# Patient Record
Sex: MALE | Race: WHITE
[De-identification: names, ages, dates, MRNs, and addresses within clinical notes are randomized per-mention and may not be internally consistent; named-entity substitution may affect disease eponyms.]

---

## 2018-09-04 ENCOUNTER — HOSPITAL ENCOUNTER (EMERGENCY)
Dept: HOSPITAL 12 - ER | Age: 48
LOS: 1 days | Discharge: HOME | End: 2018-09-05
Payer: COMMERCIAL

## 2018-09-04 VITALS — WEIGHT: 176 LBS | HEIGHT: 67 IN | BODY MASS INDEX: 27.62 KG/M2

## 2018-09-04 DIAGNOSIS — K12.2: Primary | ICD-10-CM

## 2018-09-04 DIAGNOSIS — F17.200: ICD-10-CM

## 2018-09-04 DIAGNOSIS — I25.10: ICD-10-CM

## 2018-09-04 LAB
ALP SERPL-CCNC: 90 U/L (ref 50–136)
ALT SERPL W/O P-5'-P-CCNC: 46 U/L (ref 16–63)
AST SERPL-CCNC: 29 U/L (ref 15–37)
BASOPHILS # BLD AUTO: 0.1 K/UL (ref 0–8)
BASOPHILS NFR BLD AUTO: 1 % (ref 0–2)
BILIRUB DIRECT SERPL-MCNC: 0.1 MG/DL (ref 0–0.2)
BILIRUB SERPL-MCNC: 0.3 MG/DL (ref 0.2–1)
BUN SERPL-MCNC: 12 MG/DL (ref 7–18)
CHLORIDE SERPL-SCNC: 108 MMOL/L (ref 98–107)
CO2 SERPL-SCNC: 28 MMOL/L (ref 21–32)
CREAT SERPL-MCNC: 0.7 MG/DL (ref 0.6–1.3)
EOSINOPHIL # BLD AUTO: 0.4 K/UL (ref 0–0.7)
EOSINOPHIL NFR BLD AUTO: 2.7 % (ref 0–7)
GLUCOSE SERPL-MCNC: 95 MG/DL (ref 74–106)
HCT VFR BLD AUTO: 41.9 % (ref 36.7–47.1)
HGB BLD-MCNC: 14.4 G/DL (ref 12.5–16.3)
LYMPHOCYTES # BLD AUTO: 3.3 K/UL (ref 20–40)
LYMPHOCYTES NFR BLD AUTO: 25.3 % (ref 20.5–51.5)
MCH RBC QN AUTO: 32.1 UUG (ref 23.8–33.4)
MCHC RBC AUTO-ENTMCNC: 34 G/DL (ref 32.5–36.3)
MCV RBC AUTO: 93.2 FL (ref 73–96.2)
MONOCYTES # BLD AUTO: 0.8 K/UL (ref 2–10)
MONOCYTES NFR BLD AUTO: 5.8 % (ref 0–11)
NEUTROPHILS # BLD AUTO: 8.5 K/UL (ref 1.8–8.9)
NEUTROPHILS NFR BLD AUTO: 65.2 % (ref 38.5–71.5)
PLATELET # BLD AUTO: 357 K/UL (ref 152–348)
POTASSIUM SERPL-SCNC: 3.7 MMOL/L (ref 3.5–5.1)
RBC # BLD AUTO: 4.49 MIL/UL (ref 4.06–5.63)
WBC # BLD AUTO: 13 K/UL (ref 3.6–10.2)
WS STN SPEC: 7.4 G/DL (ref 6.4–8.2)

## 2018-09-04 PROCEDURE — A4663 DIALYSIS BLOOD PRESSURE CUFF: HCPCS

## 2018-09-05 VITALS — DIASTOLIC BLOOD PRESSURE: 75 MMHG | SYSTOLIC BLOOD PRESSURE: 105 MMHG

## 2018-09-05 NOTE — NUR
Patient discharged to home in stable conditon.  Written and verbal after care 
instructions given. 

Patient verbalizes understanding of instructions. Pt out of ER with steady 
gait, no acute signs of distress, VSS, all belongings taken, IV site 
discontinued.

## 2019-02-16 ENCOUNTER — HOSPITAL ENCOUNTER (EMERGENCY)
Dept: HOSPITAL 12 - ER | Age: 49
Discharge: HOME | End: 2019-02-16
Payer: COMMERCIAL

## 2019-02-16 VITALS — BODY MASS INDEX: 28.12 KG/M2 | WEIGHT: 175 LBS | HEIGHT: 66 IN

## 2019-02-16 DIAGNOSIS — R07.9: ICD-10-CM

## 2019-02-16 DIAGNOSIS — B34.9: Primary | ICD-10-CM

## 2019-02-16 DIAGNOSIS — F17.290: ICD-10-CM

## 2019-02-16 PROCEDURE — A4663 DIALYSIS BLOOD PRESSURE CUFF: HCPCS

## 2019-02-16 PROCEDURE — 71045 X-RAY EXAM CHEST 1 VIEW: CPT

## 2019-02-16 PROCEDURE — 87400 INFLUENZA A/B EACH AG IA: CPT

## 2019-02-16 PROCEDURE — 99284 EMERGENCY DEPT VISIT MOD MDM: CPT

## 2019-02-16 PROCEDURE — 99406 BEHAV CHNG SMOKING 3-10 MIN: CPT

## 2019-02-16 PROCEDURE — 93005 ELECTROCARDIOGRAM TRACING: CPT

## 2019-02-16 PROCEDURE — 96372 THER/PROPH/DIAG INJ SC/IM: CPT

## 2019-02-16 NOTE — NUR
Patient discharged to home in stable conditon.  Written and verbal after care 
instructions given. 

Patient verbalizes understanding of instructions. Pt. d/c w/ prescription per 
MD, d/c papers signed, all belongings w/ pt., ID band removed, ambulated off 
unit w/ steady gait w/ family, left in private vehicle, instructed not to 
drive, NAD

## 2019-02-16 NOTE — NUR
Pt. ambulated into ED w/ c/o flu like symptoms - weakness, general malaise, and 
lower back pain x 3 days, denies N/V/D

## 2019-06-17 ENCOUNTER — HOSPITAL ENCOUNTER (EMERGENCY)
Dept: HOSPITAL 12 - ER | Age: 49
Discharge: HOME | End: 2019-06-17
Payer: COMMERCIAL

## 2019-06-17 VITALS — WEIGHT: 175 LBS | BODY MASS INDEX: 27.47 KG/M2 | HEIGHT: 67 IN

## 2019-06-17 VITALS — SYSTOLIC BLOOD PRESSURE: 153 MMHG | DIASTOLIC BLOOD PRESSURE: 96 MMHG

## 2019-06-17 DIAGNOSIS — F17.290: ICD-10-CM

## 2019-06-17 DIAGNOSIS — N20.0: Primary | ICD-10-CM

## 2019-06-17 LAB
ALP SERPL-CCNC: 100 U/L (ref 50–136)
ALT SERPL W/O P-5'-P-CCNC: 38 U/L (ref 16–63)
APPEARANCE UR: (no result)
AST SERPL-CCNC: 16 U/L (ref 15–37)
BASOPHILS # BLD AUTO: 0.1 K/UL (ref 0–8)
BASOPHILS NFR BLD AUTO: 1.3 % (ref 0–2)
BILIRUB DIRECT SERPL-MCNC: 0.1 MG/DL (ref 0–0.2)
BILIRUB SERPL-MCNC: 0.4 MG/DL (ref 0.2–1)
BILIRUB UR QL STRIP: (no result)
BUN SERPL-MCNC: 11 MG/DL (ref 7–18)
CHLORIDE SERPL-SCNC: 103 MMOL/L (ref 98–107)
CO2 SERPL-SCNC: 24 MMOL/L (ref 21–32)
COLOR UR: (no result)
CREAT SERPL-MCNC: 0.8 MG/DL (ref 0.6–1.3)
DEPRECATED SQUAMOUS URNS QL MICRO: (no result) /HPF
EOSINOPHIL # BLD AUTO: 0.1 K/UL (ref 0–0.7)
EOSINOPHIL NFR BLD AUTO: 1.1 % (ref 0–7)
GLUCOSE SERPL-MCNC: 111 MG/DL (ref 74–106)
GLUCOSE UR STRIP-MCNC: NEGATIVE MG/DL
HCT VFR BLD AUTO: 43.9 % (ref 36.7–47.1)
HGB BLD-MCNC: 14.7 G/DL (ref 12.5–16.3)
HGB UR QL STRIP: (no result)
KETONES UR STRIP-MCNC: NEGATIVE MG/DL
LEUKOCYTE ESTERASE UR QL STRIP: NEGATIVE
LIPASE SERPL-CCNC: 64 U/L (ref 73–393)
LYMPHOCYTES # BLD AUTO: 2.5 K/UL (ref 20–40)
LYMPHOCYTES NFR BLD AUTO: 33.9 % (ref 20.5–51.5)
MCH RBC QN AUTO: 31.1 UUG (ref 23.8–33.4)
MCHC RBC AUTO-ENTMCNC: 34 G/DL (ref 32.5–36.3)
MCV RBC AUTO: 92.7 FL (ref 73–96.2)
MONOCYTES # BLD AUTO: 0.6 K/UL (ref 2–10)
MONOCYTES NFR BLD AUTO: 8.4 % (ref 0–11)
NEUTROPHILS # BLD AUTO: 4.1 K/UL (ref 1.8–8.9)
NEUTROPHILS NFR BLD AUTO: 55.3 % (ref 38.5–71.5)
NITRITE UR QL STRIP: NEGATIVE
PH UR STRIP: 6.5 [PH] (ref 5–8)
PLATELET # BLD AUTO: 339 K/UL (ref 152–348)
POTASSIUM SERPL-SCNC: 3.9 MMOL/L (ref 3.5–5.1)
RBC # BLD AUTO: 4.74 MIL/UL (ref 4.06–5.63)
RBC #/AREA URNS HPF: (no result) /HPF (ref 0–3)
SP GR UR STRIP: 1.02 (ref 1–1.03)
UROBILINOGEN UR STRIP-MCNC: 0.2 E.U./DL
WBC # BLD AUTO: 7.4 K/UL (ref 3.6–10.2)
WBC #/AREA URNS HPF: (no result) /HPF
WBC #/AREA URNS HPF: (no result) /HPF (ref 0–3)
WS STN SPEC: 7.5 G/DL (ref 6.4–8.2)

## 2019-06-17 PROCEDURE — 96374 THER/PROPH/DIAG INJ IV PUSH: CPT

## 2019-06-17 PROCEDURE — A4663 DIALYSIS BLOOD PRESSURE CUFF: HCPCS

## 2019-06-17 PROCEDURE — 99284 EMERGENCY DEPT VISIT MOD MDM: CPT

## 2019-06-17 PROCEDURE — 80076 HEPATIC FUNCTION PANEL: CPT

## 2019-06-17 PROCEDURE — 80048 BASIC METABOLIC PNL TOTAL CA: CPT

## 2019-06-17 PROCEDURE — 96375 TX/PRO/DX INJ NEW DRUG ADDON: CPT

## 2019-06-17 PROCEDURE — 85025 COMPLETE CBC W/AUTO DIFF WBC: CPT

## 2019-06-17 PROCEDURE — 81001 URINALYSIS AUTO W/SCOPE: CPT

## 2019-06-17 PROCEDURE — 81000 URINALYSIS NONAUTO W/SCOPE: CPT

## 2019-06-17 PROCEDURE — 74176 CT ABD & PELVIS W/O CONTRAST: CPT

## 2019-06-17 PROCEDURE — 83690 ASSAY OF LIPASE: CPT

## 2019-06-17 PROCEDURE — 99406 BEHAV CHNG SMOKING 3-10 MIN: CPT

## 2019-06-17 PROCEDURE — 36415 COLL VENOUS BLD VENIPUNCTURE: CPT

## 2019-06-17 PROCEDURE — 96376 TX/PRO/DX INJ SAME DRUG ADON: CPT

## 2019-06-17 NOTE — NUR
STARTED IVF NS 500ML INFUSING WELL AS ORDERED. MEDICATED WITH TORADOL 30MG SLOW 
IVP AS ORDERED FOR PAIN.

## 2019-06-17 NOTE — NUR
DISCHARGE INSTRUCTIONS AND PRESCRIPTIONS GIVEN TO PT WITH GOOD UNDERSTANDING. 
PT REQUESTED TO HAVE ANOTHER PAIN MEDICATION BEFORE GOING HOME.

## 2019-06-17 NOTE — NUR
ADMIT PT IN RM 2A, AMBULATORY, FROM HOME, ACCOMPANIED BY WIFE. C/O ACUTE 
EXCRUCIATING ABDOMENAL PAIN ON LOWER RIGHT SIDE LEVEL 10. SEEN AND EXAMINED BY 
MD WITH NEW ORDERS. AFEBRILE. NO N/V NOTED.

## 2019-06-28 ENCOUNTER — HOSPITAL ENCOUNTER (EMERGENCY)
Dept: HOSPITAL 12 - ER | Age: 49
Discharge: HOME | End: 2019-06-28
Payer: COMMERCIAL

## 2019-06-28 VITALS — DIASTOLIC BLOOD PRESSURE: 81 MMHG | SYSTOLIC BLOOD PRESSURE: 139 MMHG

## 2019-06-28 VITALS — HEIGHT: 67 IN | WEIGHT: 175 LBS | BODY MASS INDEX: 27.47 KG/M2

## 2019-06-28 DIAGNOSIS — F17.200: ICD-10-CM

## 2019-06-28 DIAGNOSIS — N20.1: Primary | ICD-10-CM

## 2019-06-28 LAB
ALP SERPL-CCNC: 96 U/L (ref 50–136)
ALT SERPL W/O P-5'-P-CCNC: 35 U/L (ref 16–63)
APPEARANCE UR: CLEAR
AST SERPL-CCNC: 14 U/L (ref 15–37)
BASOPHILS # BLD AUTO: 0 K/UL (ref 0–8)
BASOPHILS NFR BLD AUTO: 0.2 % (ref 0–2)
BILIRUB DIRECT SERPL-MCNC: < 0.1 MG/DL (ref 0–0.2)
BILIRUB SERPL-MCNC: 0.2 MG/DL (ref 0.2–1)
BILIRUB UR QL STRIP: NEGATIVE
BUN SERPL-MCNC: 13 MG/DL (ref 7–18)
CHLORIDE SERPL-SCNC: 103 MMOL/L (ref 98–107)
CO2 SERPL-SCNC: 24 MMOL/L (ref 21–32)
COLOR UR: YELLOW
CREAT SERPL-MCNC: 0.8 MG/DL (ref 0.6–1.3)
EOSINOPHIL # BLD AUTO: 0.1 K/UL (ref 0–0.7)
EOSINOPHIL NFR BLD AUTO: 0.7 % (ref 0–7)
GLUCOSE SERPL-MCNC: 120 MG/DL (ref 74–106)
GLUCOSE UR STRIP-MCNC: NEGATIVE MG/DL
HCT VFR BLD AUTO: 42.6 % (ref 36.7–47.1)
HGB BLD-MCNC: 14.3 G/DL (ref 12.5–16.3)
HGB UR QL STRIP: NEGATIVE
KETONES UR STRIP-MCNC: NEGATIVE MG/DL
LEUKOCYTE ESTERASE UR QL STRIP: NEGATIVE
LIPASE SERPL-CCNC: 75 U/L (ref 73–393)
LYMPHOCYTES # BLD AUTO: 2.6 K/UL (ref 20–40)
LYMPHOCYTES NFR BLD AUTO: 29.8 % (ref 20.5–51.5)
MCH RBC QN AUTO: 31.4 UUG (ref 23.8–33.4)
MCHC RBC AUTO-ENTMCNC: 34 G/DL (ref 32.5–36.3)
MCV RBC AUTO: 93.3 FL (ref 73–96.2)
MONOCYTES # BLD AUTO: 0.5 K/UL (ref 2–10)
MONOCYTES NFR BLD AUTO: 6.2 % (ref 0–11)
NEUTROPHILS # BLD AUTO: 5.4 K/UL (ref 1.8–8.9)
NEUTROPHILS NFR BLD AUTO: 63.1 % (ref 38.5–71.5)
NITRITE UR QL STRIP: NEGATIVE
PH UR STRIP: 6 [PH] (ref 5–8)
PLATELET # BLD AUTO: 356 K/UL (ref 152–348)
POTASSIUM SERPL-SCNC: 3.8 MMOL/L (ref 3.5–5.1)
RBC # BLD AUTO: 4.56 MIL/UL (ref 4.06–5.63)
SP GR UR STRIP: 1.02 (ref 1–1.03)
UROBILINOGEN UR STRIP-MCNC: 0.2 E.U./DL
WBC # BLD AUTO: 8.6 K/UL (ref 3.6–10.2)
WS STN SPEC: 7.4 G/DL (ref 6.4–8.2)

## 2019-06-28 PROCEDURE — 80076 HEPATIC FUNCTION PANEL: CPT

## 2019-06-28 PROCEDURE — 81001 URINALYSIS AUTO W/SCOPE: CPT

## 2019-06-28 PROCEDURE — 80048 BASIC METABOLIC PNL TOTAL CA: CPT

## 2019-06-28 PROCEDURE — A4663 DIALYSIS BLOOD PRESSURE CUFF: HCPCS

## 2019-06-28 PROCEDURE — 99284 EMERGENCY DEPT VISIT MOD MDM: CPT

## 2019-06-28 PROCEDURE — 83690 ASSAY OF LIPASE: CPT

## 2019-06-28 PROCEDURE — 93005 ELECTROCARDIOGRAM TRACING: CPT

## 2019-06-28 PROCEDURE — 96361 HYDRATE IV INFUSION ADD-ON: CPT

## 2019-06-28 PROCEDURE — 96374 THER/PROPH/DIAG INJ IV PUSH: CPT

## 2019-06-28 PROCEDURE — 76705 ECHO EXAM OF ABDOMEN: CPT

## 2019-06-28 PROCEDURE — 85025 COMPLETE CBC W/AUTO DIFF WBC: CPT

## 2019-06-28 PROCEDURE — 96375 TX/PRO/DX INJ NEW DRUG ADDON: CPT

## 2019-06-28 PROCEDURE — 74176 CT ABD & PELVIS W/O CONTRAST: CPT

## 2019-06-28 PROCEDURE — 36415 COLL VENOUS BLD VENIPUNCTURE: CPT

## 2019-07-01 ENCOUNTER — HOSPITAL ENCOUNTER (INPATIENT)
Dept: HOSPITAL 12 - ER | Age: 49
LOS: 2 days | Discharge: HOME | DRG: 465 | End: 2019-07-03
Attending: INTERNAL MEDICINE | Admitting: INTERNAL MEDICINE
Payer: COMMERCIAL

## 2019-07-01 VITALS — HEIGHT: 67 IN | WEIGHT: 175 LBS | BODY MASS INDEX: 27.47 KG/M2

## 2019-07-01 VITALS — DIASTOLIC BLOOD PRESSURE: 78 MMHG | SYSTOLIC BLOOD PRESSURE: 128 MMHG

## 2019-07-01 DIAGNOSIS — D69.6: ICD-10-CM

## 2019-07-01 DIAGNOSIS — E78.5: ICD-10-CM

## 2019-07-01 DIAGNOSIS — K76.9: ICD-10-CM

## 2019-07-01 DIAGNOSIS — K57.90: ICD-10-CM

## 2019-07-01 DIAGNOSIS — D72.829: ICD-10-CM

## 2019-07-01 DIAGNOSIS — N13.2: Primary | ICD-10-CM

## 2019-07-01 DIAGNOSIS — E44.1: ICD-10-CM

## 2019-07-01 DIAGNOSIS — Z87.442: ICD-10-CM

## 2019-07-01 DIAGNOSIS — E78.1: ICD-10-CM

## 2019-07-01 DIAGNOSIS — K80.20: ICD-10-CM

## 2019-07-01 DIAGNOSIS — F17.210: ICD-10-CM

## 2019-07-01 LAB
APPEARANCE UR: CLEAR
BASOPHILS # BLD AUTO: 0.1 K/UL (ref 0–8)
BASOPHILS NFR BLD AUTO: 1 % (ref 0–2)
BILIRUB UR QL STRIP: (no result)
BUN SERPL-MCNC: 17 MG/DL (ref 7–18)
CHLORIDE SERPL-SCNC: 104 MMOL/L (ref 98–107)
CO2 SERPL-SCNC: 23 MMOL/L (ref 21–32)
COLOR UR: YELLOW
CREAT SERPL-MCNC: 1 MG/DL (ref 0.6–1.3)
DEPRECATED SQUAMOUS URNS QL MICRO: (no result) /HPF
EOSINOPHIL # BLD AUTO: 0.1 K/UL (ref 0–0.7)
EOSINOPHIL NFR BLD AUTO: 1.1 % (ref 0–7)
GLUCOSE SERPL-MCNC: 113 MG/DL (ref 74–106)
GLUCOSE UR STRIP-MCNC: NEGATIVE MG/DL
HCT VFR BLD AUTO: 44.2 % (ref 36.7–47.1)
HGB BLD-MCNC: 14.8 G/DL (ref 12.5–16.3)
HGB UR QL STRIP: (no result)
KETONES UR STRIP-MCNC: NEGATIVE MG/DL
LEUKOCYTE ESTERASE UR QL STRIP: NEGATIVE
LYMPHOCYTES # BLD AUTO: 3.6 K/UL (ref 20–40)
LYMPHOCYTES NFR BLD AUTO: 30.1 % (ref 20.5–51.5)
MCH RBC QN AUTO: 31 UUG (ref 23.8–33.4)
MCHC RBC AUTO-ENTMCNC: 33 G/DL (ref 32.5–36.3)
MCV RBC AUTO: 92.8 FL (ref 73–96.2)
MONOCYTES # BLD AUTO: 0.8 K/UL (ref 2–10)
MONOCYTES NFR BLD AUTO: 6.8 % (ref 0–11)
NEUTROPHILS # BLD AUTO: 7.3 K/UL (ref 1.8–8.9)
NEUTROPHILS NFR BLD AUTO: 61 % (ref 38.5–71.5)
NITRITE UR QL STRIP: NEGATIVE
PH UR STRIP: 7 [PH] (ref 5–8)
PLATELET # BLD AUTO: 351 K/UL (ref 152–348)
POTASSIUM SERPL-SCNC: 3.9 MMOL/L (ref 3.5–5.1)
RBC # BLD AUTO: 4.76 MIL/UL (ref 4.06–5.63)
SP GR UR STRIP: 1.02 (ref 1–1.03)
UROBILINOGEN UR STRIP-MCNC: 0.2 E.U./DL
WBC # BLD AUTO: 11.9 K/UL (ref 3.6–10.2)
WBC #/AREA URNS HPF: (no result) /HPF
WBC #/AREA URNS HPF: (no result) /HPF (ref 0–3)

## 2019-07-01 PROCEDURE — A4663 DIALYSIS BLOOD PRESSURE CUFF: HCPCS

## 2019-07-01 PROCEDURE — G0378 HOSPITAL OBSERVATION PER HR: HCPCS

## 2019-07-01 RX ADMIN — HYDROMORPHONE HYDROCHLORIDE PRN MG: 1 INJECTION, SOLUTION INTRAMUSCULAR; INTRAVENOUS; SUBCUTANEOUS at 21:26

## 2019-07-01 RX ADMIN — TAMSULOSIN HYDROCHLORIDE SCH MG: 0.4 CAPSULE ORAL at 21:43

## 2019-07-01 RX ADMIN — SODIUM CHLORIDE PRN MLS/HR: 0.45 INJECTION, SOLUTION INTRAVENOUS at 23:55

## 2019-07-01 RX ADMIN — DEXTROSE SCH MLS/HR: 50 INJECTION, SOLUTION INTRAVENOUS at 22:22

## 2019-07-01 NOTE — NUR
PATIENT SEEN BY DR. MONSON WITH NEW ORDER. WILL ENDORSED ALL INSTRUCTIONS TO AM NURSE. 
CONT TO MONITOR.

## 2019-07-02 VITALS — DIASTOLIC BLOOD PRESSURE: 66 MMHG | SYSTOLIC BLOOD PRESSURE: 122 MMHG

## 2019-07-02 VITALS — DIASTOLIC BLOOD PRESSURE: 71 MMHG | SYSTOLIC BLOOD PRESSURE: 116 MMHG

## 2019-07-02 VITALS — DIASTOLIC BLOOD PRESSURE: 78 MMHG | SYSTOLIC BLOOD PRESSURE: 126 MMHG

## 2019-07-02 VITALS — SYSTOLIC BLOOD PRESSURE: 116 MMHG | DIASTOLIC BLOOD PRESSURE: 73 MMHG

## 2019-07-02 LAB
ALP SERPL-CCNC: 85 U/L (ref 50–136)
ALT SERPL W/O P-5'-P-CCNC: 24 U/L (ref 16–63)
AST SERPL-CCNC: 15 U/L (ref 15–37)
BASOPHILS # BLD AUTO: 0 K/UL (ref 0–8)
BASOPHILS NFR BLD AUTO: 0.4 % (ref 0–2)
BILIRUB SERPL-MCNC: 0.2 MG/DL (ref 0.2–1)
BUN SERPL-MCNC: 19 MG/DL (ref 7–18)
CHLORIDE SERPL-SCNC: 106 MMOL/L (ref 98–107)
CHOLEST SERPL-MCNC: 191 MG/DL (ref ?–200)
CO2 SERPL-SCNC: 27 MMOL/L (ref 21–32)
CREAT SERPL-MCNC: 0.9 MG/DL (ref 0.6–1.3)
EOSINOPHIL # BLD AUTO: 0.1 K/UL (ref 0–0.7)
EOSINOPHIL NFR BLD AUTO: 1.2 % (ref 0–7)
GLUCOSE SERPL-MCNC: 108 MG/DL (ref 74–106)
HCT VFR BLD AUTO: 40.8 % (ref 36.7–47.1)
HDLC SERPL-MCNC: 27 MG/DL (ref 40–60)
HGB BLD-MCNC: 13.4 G/DL (ref 12.5–16.3)
LYMPHOCYTES # BLD AUTO: 2.1 K/UL (ref 20–40)
LYMPHOCYTES NFR BLD AUTO: 20.6 % (ref 20.5–51.5)
MAGNESIUM SERPL-MCNC: 1.9 MG/DL (ref 1.8–2.4)
MCH RBC QN AUTO: 30.9 UUG (ref 23.8–33.4)
MCHC RBC AUTO-ENTMCNC: 33 G/DL (ref 32.5–36.3)
MCV RBC AUTO: 94.1 FL (ref 73–96.2)
MONOCYTES # BLD AUTO: 1 K/UL (ref 2–10)
MONOCYTES NFR BLD AUTO: 9.4 % (ref 0–11)
NEUTROPHILS # BLD AUTO: 6.9 K/UL (ref 1.8–8.9)
NEUTROPHILS NFR BLD AUTO: 68.4 % (ref 38.5–71.5)
PHOSPHATE SERPL-MCNC: 3.7 MG/DL (ref 2.5–4.9)
PLATELET # BLD AUTO: 317 K/UL (ref 152–348)
POTASSIUM SERPL-SCNC: 4.1 MMOL/L (ref 3.5–5.1)
RBC # BLD AUTO: 4.34 MIL/UL (ref 4.06–5.63)
TRIGL SERPL-MCNC: 247 MG/DL (ref 30–150)
WBC # BLD AUTO: 10.1 K/UL (ref 3.6–10.2)
WS STN SPEC: 6.4 G/DL (ref 6.4–8.2)

## 2019-07-02 RX ADMIN — KETOROLAC TROMETHAMINE SCH MG: 15 INJECTION, SOLUTION INTRAMUSCULAR; INTRAVENOUS at 17:19

## 2019-07-02 RX ADMIN — DEXTROSE SCH MLS/HR: 50 INJECTION, SOLUTION INTRAVENOUS at 21:30

## 2019-07-02 RX ADMIN — KETOROLAC TROMETHAMINE SCH MG: 15 INJECTION, SOLUTION INTRAMUSCULAR; INTRAVENOUS at 07:56

## 2019-07-02 RX ADMIN — HYDROMORPHONE HYDROCHLORIDE PRN MG: 1 INJECTION, SOLUTION INTRAMUSCULAR; INTRAVENOUS; SUBCUTANEOUS at 18:29

## 2019-07-02 RX ADMIN — KETOROLAC TROMETHAMINE SCH MG: 15 INJECTION, SOLUTION INTRAMUSCULAR; INTRAVENOUS at 20:34

## 2019-07-02 RX ADMIN — TAMSULOSIN HYDROCHLORIDE SCH MG: 0.4 CAPSULE ORAL at 08:07

## 2019-07-02 RX ADMIN — SODIUM CHLORIDE PRN MLS/HR: 0.45 INJECTION, SOLUTION INTRAVENOUS at 09:29

## 2019-07-02 RX ADMIN — HYDROMORPHONE HYDROCHLORIDE PRN MG: 1 INJECTION, SOLUTION INTRAMUSCULAR; INTRAVENOUS; SUBCUTANEOUS at 00:38

## 2019-07-02 RX ADMIN — KETOROLAC TROMETHAMINE SCH MG: 15 INJECTION, SOLUTION INTRAMUSCULAR; INTRAVENOUS at 13:39

## 2019-07-02 RX ADMIN — HYDROMORPHONE HYDROCHLORIDE PRN MG: 1 INJECTION, SOLUTION INTRAMUSCULAR; INTRAVENOUS; SUBCUTANEOUS at 22:18

## 2019-07-02 RX ADMIN — SODIUM CHLORIDE PRN MLS/HR: 0.45 INJECTION, SOLUTION INTRAVENOUS at 04:29

## 2019-07-02 RX ADMIN — HYDROMORPHONE HYDROCHLORIDE PRN MG: 1 INJECTION, SOLUTION INTRAMUSCULAR; INTRAVENOUS; SUBCUTANEOUS at 03:47

## 2019-07-02 RX ADMIN — NICOTINE SCH MG: 21 PATCH, EXTENDED RELEASE TOPICAL at 14:34

## 2019-07-02 RX ADMIN — TAMSULOSIN HYDROCHLORIDE SCH MG: 0.4 CAPSULE ORAL at 20:32

## 2019-07-02 RX ADMIN — SODIUM CHLORIDE PRN MLS/HR: 0.45 INJECTION, SOLUTION INTRAVENOUS at 16:59

## 2019-07-02 NOTE — NUR
PATIENT ALERT ORIENTED, NO SOB NO CHEST PAIN. CONT ON PAIN MANAGEMENT DUE RENAL COLIC 
DIAGNOSIS. CONT TO STRAINED ALL URINE, NO STONE SEEN YET. CALL LIGHT WITHIN REACH.

## 2019-07-02 NOTE — NUR
Received patient awake in bed, alert and oriented x 4, ambulatory. No complaints at the 
moment. Noted with IV access on the right forearm to ongoing IV fluid, infusing well. Noted 
patient for surgery tomorrow, consent attached to chart. Patient instructed to remain on 
nothing per [place] after midnight in preparation for surgery. Patient will be picked up by OR 
staff at 6am per change of shift report. Patient requesting to have a shower tonight, will 
have CNA arrange for a shower. Patient instructed that we need to keep straining his urine, 
patient verbalized understanding. Will continue to monitor.

## 2019-07-03 VITALS — DIASTOLIC BLOOD PRESSURE: 70 MMHG | SYSTOLIC BLOOD PRESSURE: 102 MMHG

## 2019-07-03 VITALS — SYSTOLIC BLOOD PRESSURE: 127 MMHG | DIASTOLIC BLOOD PRESSURE: 78 MMHG

## 2019-07-03 VITALS — SYSTOLIC BLOOD PRESSURE: 117 MMHG | DIASTOLIC BLOOD PRESSURE: 74 MMHG

## 2019-07-03 LAB
ALP SERPL-CCNC: 83 U/L (ref 50–136)
ALT SERPL W/O P-5'-P-CCNC: 23 U/L (ref 16–63)
AST SERPL-CCNC: 21 U/L (ref 15–37)
BASOPHILS # BLD AUTO: 0 K/UL (ref 0–8)
BASOPHILS NFR BLD AUTO: 0.6 % (ref 0–2)
BILIRUB SERPL-MCNC: 0.2 MG/DL (ref 0.2–1)
BUN SERPL-MCNC: 13 MG/DL (ref 7–18)
CHLORIDE SERPL-SCNC: 109 MMOL/L (ref 98–107)
CO2 SERPL-SCNC: 24 MMOL/L (ref 21–32)
CREAT SERPL-MCNC: 0.6 MG/DL (ref 0.6–1.3)
EOSINOPHIL # BLD AUTO: 0.2 K/UL (ref 0–0.7)
EOSINOPHIL NFR BLD AUTO: 1.9 % (ref 0–7)
GLUCOSE SERPL-MCNC: 102 MG/DL (ref 74–106)
HCT VFR BLD AUTO: 36.8 % (ref 36.7–47.1)
HGB BLD-MCNC: 12.2 G/DL (ref 12.5–16.3)
LYMPHOCYTES # BLD AUTO: 3.4 K/UL (ref 20–40)
LYMPHOCYTES NFR BLD AUTO: 42.4 % (ref 20.5–51.5)
MAGNESIUM SERPL-MCNC: 1.9 MG/DL (ref 1.8–2.4)
MCH RBC QN AUTO: 31 UUG (ref 23.8–33.4)
MCHC RBC AUTO-ENTMCNC: 33 G/DL (ref 32.5–36.3)
MCV RBC AUTO: 93.3 FL (ref 73–96.2)
MONOCYTES # BLD AUTO: 0.6 K/UL (ref 2–10)
MONOCYTES NFR BLD AUTO: 7.5 % (ref 0–11)
NEUTROPHILS # BLD AUTO: 3.8 K/UL (ref 1.8–8.9)
NEUTROPHILS NFR BLD AUTO: 47.6 % (ref 38.5–71.5)
PHOSPHATE SERPL-MCNC: 3.7 MG/DL (ref 2.5–4.9)
PLATELET # BLD AUTO: 295 K/UL (ref 152–348)
POTASSIUM SERPL-SCNC: 4.2 MMOL/L (ref 3.5–5.1)
RBC # BLD AUTO: 3.94 MIL/UL (ref 4.06–5.63)
WBC # BLD AUTO: 8 K/UL (ref 3.6–10.2)
WS STN SPEC: 5.9 G/DL (ref 6.4–8.2)

## 2019-07-03 PROCEDURE — 0T768DZ DILATION OF RIGHT URETER WITH INTRALUMINAL DEVICE, VIA NATURAL OR ARTIFICIAL OPENING ENDOSCOPIC: ICD-10-PCS

## 2019-07-03 RX ADMIN — HYDROMORPHONE HYDROCHLORIDE PRN MG: 1 INJECTION, SOLUTION INTRAMUSCULAR; INTRAVENOUS; SUBCUTANEOUS at 12:38

## 2019-07-03 RX ADMIN — KETOROLAC TROMETHAMINE SCH MG: 15 INJECTION, SOLUTION INTRAMUSCULAR; INTRAVENOUS at 12:38

## 2019-07-03 RX ADMIN — HYDROMORPHONE HYDROCHLORIDE PRN MG: 1 INJECTION, SOLUTION INTRAMUSCULAR; INTRAVENOUS; SUBCUTANEOUS at 05:46

## 2019-07-03 RX ADMIN — SODIUM CHLORIDE PRN MLS/HR: 0.45 INJECTION, SOLUTION INTRAVENOUS at 08:41

## 2019-07-03 RX ADMIN — KETOROLAC TROMETHAMINE SCH MG: 15 INJECTION, SOLUTION INTRAMUSCULAR; INTRAVENOUS at 08:31

## 2019-07-03 RX ADMIN — TAMSULOSIN HYDROCHLORIDE SCH MG: 0.4 CAPSULE ORAL at 08:31

## 2019-07-03 RX ADMIN — HYDROMORPHONE HYDROCHLORIDE PRN MG: 1 INJECTION, SOLUTION INTRAMUSCULAR; INTRAVENOUS; SUBCUTANEOUS at 15:40

## 2019-07-03 RX ADMIN — SODIUM CHLORIDE PRN MLS/HR: 0.45 INJECTION, SOLUTION INTRAVENOUS at 15:40

## 2019-07-03 RX ADMIN — NICOTINE SCH MG: 21 PATCH, EXTENDED RELEASE TOPICAL at 08:31

## 2019-07-03 RX ADMIN — KETOROLAC TROMETHAMINE SCH MG: 15 INJECTION, SOLUTION INTRAMUSCULAR; INTRAVENOUS at 17:04

## 2019-07-03 RX ADMIN — HYDROMORPHONE HYDROCHLORIDE PRN MG: 1 INJECTION, SOLUTION INTRAMUSCULAR; INTRAVENOUS; SUBCUTANEOUS at 08:36

## 2019-07-03 RX ADMIN — SODIUM CHLORIDE PRN MLS/HR: 0.45 INJECTION, SOLUTION INTRAVENOUS at 01:22

## 2019-07-03 RX ADMIN — HYDROMORPHONE HYDROCHLORIDE PRN MG: 1 INJECTION, SOLUTION INTRAMUSCULAR; INTRAVENOUS; SUBCUTANEOUS at 01:28

## 2019-07-03 NOTE — NUR
Patient slept intermittently throughout the night. Remained nothing per [place] after midnight. 
Noted for surgery this morning. With complaints of lower abdomen and back pain, prn pain 
medications given with relief noted. Will continue to monitor until OR staff takes him to 
surgery room. Noted patient's wife present at bedside.

## 2019-07-03 NOTE — NUR
RECEIVED PATIENT FROM OR, AWAKE ALERT AND ORIENTED X4. VITAL SIGNS WNL, NO COMPLICATIONS 
FROM SURGERY. STENT PLACEMENT IN RIGHT URETHRA. PATIENT REPORTING PAIN 6/10. WIFE AT 
BEDSIDE. BED IN LOWEST POSITION, SIDE RAILS UP X2, CALL LIGHT WITHIN REACH. WILL CONTINUE TO 
MONITOR.

## 2019-07-03 NOTE — NUR
DISCHARGED PATIENT PER DR SALAZAR. REVIEWED DC INSTRUCTIONS WITH PATIENT AND WIFE. 
PRESCRIPTIONS GIVEN TO PATIENT. PATIENT VERBALIZES UNDERSTANDING OF DISCHARGE INSTRUCTIONS. 
DC'D IV AND REMOVED ID BRACELET. ACCOMPANIED PATIENT DOWNSTAIRS WITH NO COMPLICATIONS.

## 2019-07-06 ENCOUNTER — HOSPITAL ENCOUNTER (EMERGENCY)
Dept: HOSPITAL 12 - ER | Age: 49
Discharge: HOME | End: 2019-07-06
Payer: COMMERCIAL

## 2019-07-06 VITALS — HEIGHT: 67 IN | WEIGHT: 175 LBS | BODY MASS INDEX: 27.47 KG/M2

## 2019-07-06 DIAGNOSIS — Z79.2: ICD-10-CM

## 2019-07-06 DIAGNOSIS — G89.18: Primary | ICD-10-CM

## 2019-07-06 DIAGNOSIS — Z79.899: ICD-10-CM

## 2019-07-06 DIAGNOSIS — F17.200: ICD-10-CM

## 2019-07-06 DIAGNOSIS — R10.9: ICD-10-CM

## 2019-07-06 DIAGNOSIS — Z96.0: ICD-10-CM

## 2019-07-06 LAB
ALP SERPL-CCNC: 82 U/L (ref 50–136)
ALT SERPL W/O P-5'-P-CCNC: 39 U/L (ref 16–63)
APPEARANCE UR: (no result)
AST SERPL-CCNC: 13 U/L (ref 15–37)
BASOPHILS # BLD AUTO: 0.1 K/UL (ref 0–8)
BASOPHILS NFR BLD AUTO: 0.7 % (ref 0–2)
BILIRUB SERPL-MCNC: 0.4 MG/DL (ref 0.2–1)
BILIRUB UR QL STRIP: (no result)
BUN SERPL-MCNC: 12 MG/DL (ref 7–18)
CHLORIDE SERPL-SCNC: 107 MMOL/L (ref 98–107)
CO2 SERPL-SCNC: 24 MMOL/L (ref 21–32)
CREAT SERPL-MCNC: 0.9 MG/DL (ref 0.6–1.3)
DEPRECATED SQUAMOUS URNS QL MICRO: (no result) /HPF
EOSINOPHIL # BLD AUTO: 0.2 K/UL (ref 0–0.7)
EOSINOPHIL NFR BLD AUTO: 0.9 % (ref 0–7)
GLUCOSE SERPL-MCNC: 108 MG/DL (ref 74–106)
GLUCOSE UR STRIP-MCNC: NEGATIVE MG/DL
HCT VFR BLD AUTO: 40.1 % (ref 36.7–47.1)
HGB BLD-MCNC: 13.4 G/DL (ref 12.5–16.3)
HGB UR QL STRIP: (no result)
KETONES UR STRIP-MCNC: (no result) MG/DL
LEUKOCYTE ESTERASE UR QL STRIP: (no result)
LYMPHOCYTES # BLD AUTO: 2.5 K/UL (ref 20–40)
LYMPHOCYTES NFR BLD AUTO: 14.1 % (ref 20.5–51.5)
MCH RBC QN AUTO: 30.8 UUG (ref 23.8–33.4)
MCHC RBC AUTO-ENTMCNC: 34 G/DL (ref 32.5–36.3)
MCV RBC AUTO: 92.1 FL (ref 73–96.2)
MONOCYTES # BLD AUTO: 1.3 K/UL (ref 2–10)
MONOCYTES NFR BLD AUTO: 7.2 % (ref 0–11)
NEUTROPHILS # BLD AUTO: 13.5 K/UL (ref 1.8–8.9)
NEUTROPHILS NFR BLD AUTO: 77.1 % (ref 38.5–71.5)
NITRITE UR QL STRIP: NEGATIVE
PH UR STRIP: 6.5 [PH] (ref 5–8)
PLATELET # BLD AUTO: 371 K/UL (ref 152–348)
POTASSIUM SERPL-SCNC: 3.7 MMOL/L (ref 3.5–5.1)
RBC # BLD AUTO: 4.36 MIL/UL (ref 4.06–5.63)
RBC #/AREA URNS HPF: (no result) /HPF (ref 0–3)
SP GR UR STRIP: 1.02 (ref 1–1.03)
UROBILINOGEN UR STRIP-MCNC: 0.2 E.U./DL
WBC # BLD AUTO: 17.5 K/UL (ref 3.6–10.2)
WBC #/AREA URNS HPF: (no result) /HPF
WBC #/AREA URNS HPF: (no result) /HPF (ref 0–3)
WS STN SPEC: 7 G/DL (ref 6.4–8.2)

## 2019-07-06 PROCEDURE — 96376 TX/PRO/DX INJ SAME DRUG ADON: CPT

## 2019-07-06 PROCEDURE — 80053 COMPREHEN METABOLIC PANEL: CPT

## 2019-07-06 PROCEDURE — 96375 TX/PRO/DX INJ NEW DRUG ADDON: CPT

## 2019-07-06 PROCEDURE — 85025 COMPLETE CBC W/AUTO DIFF WBC: CPT

## 2019-07-06 PROCEDURE — 99284 EMERGENCY DEPT VISIT MOD MDM: CPT

## 2019-07-06 PROCEDURE — 96374 THER/PROPH/DIAG INJ IV PUSH: CPT

## 2019-07-06 PROCEDURE — 76770 US EXAM ABDO BACK WALL COMP: CPT

## 2019-07-06 PROCEDURE — 81000 URINALYSIS NONAUTO W/SCOPE: CPT

## 2019-07-06 PROCEDURE — 36415 COLL VENOUS BLD VENIPUNCTURE: CPT

## 2019-07-06 PROCEDURE — 74176 CT ABD & PELVIS W/O CONTRAST: CPT

## 2019-07-06 PROCEDURE — 87086 URINE CULTURE/COLONY COUNT: CPT

## 2019-07-06 PROCEDURE — 81001 URINALYSIS AUTO W/SCOPE: CPT

## 2019-07-06 PROCEDURE — A4663 DIALYSIS BLOOD PRESSURE CUFF: HCPCS

## 2019-07-18 ENCOUNTER — HOSPITAL ENCOUNTER (INPATIENT)
Dept: HOSPITAL 12 - ER | Age: 49
LOS: 1 days | Discharge: HOME | DRG: 465 | End: 2019-07-19
Payer: COMMERCIAL

## 2019-07-18 VITALS — BODY MASS INDEX: 26.06 KG/M2 | HEIGHT: 67 IN | WEIGHT: 166 LBS

## 2019-07-18 DIAGNOSIS — N20.0: Primary | ICD-10-CM

## 2019-07-18 DIAGNOSIS — Z87.442: ICD-10-CM

## 2019-07-18 DIAGNOSIS — Z96.0: ICD-10-CM

## 2019-07-18 DIAGNOSIS — N20.1: ICD-10-CM

## 2019-07-18 DIAGNOSIS — N02.9: ICD-10-CM

## 2019-07-18 DIAGNOSIS — Z82.49: ICD-10-CM

## 2019-07-18 DIAGNOSIS — R10.9: ICD-10-CM

## 2019-07-18 LAB
ALP SERPL-CCNC: 79 U/L (ref 50–136)
ALT SERPL W/O P-5'-P-CCNC: 31 U/L (ref 16–63)
AST SERPL-CCNC: 13 U/L (ref 15–37)
BASOPHILS # BLD AUTO: 0.1 K/UL (ref 0–8)
BASOPHILS NFR BLD AUTO: 1.2 % (ref 0–2)
BILIRUB DIRECT SERPL-MCNC: 0.1 MG/DL (ref 0–0.2)
BILIRUB SERPL-MCNC: 0.2 MG/DL (ref 0.2–1)
BUN SERPL-MCNC: 20 MG/DL (ref 7–18)
CHLORIDE SERPL-SCNC: 106 MMOL/L (ref 98–107)
CO2 SERPL-SCNC: 26 MMOL/L (ref 21–32)
CREAT SERPL-MCNC: 0.9 MG/DL (ref 0.6–1.3)
EOSINOPHIL # BLD AUTO: 0.3 K/UL (ref 0–0.7)
EOSINOPHIL NFR BLD AUTO: 2.9 % (ref 0–7)
GLUCOSE SERPL-MCNC: 98 MG/DL (ref 74–106)
HCT VFR BLD AUTO: 38.7 % (ref 36.7–47.1)
HGB BLD-MCNC: 13.1 G/DL (ref 12.5–16.3)
LIPASE SERPL-CCNC: 87 U/L (ref 73–393)
LYMPHOCYTES # BLD AUTO: 3.2 K/UL (ref 20–40)
LYMPHOCYTES NFR BLD AUTO: 34.1 % (ref 20.5–51.5)
MCH RBC QN AUTO: 31.7 UUG (ref 23.8–33.4)
MCHC RBC AUTO-ENTMCNC: 34 G/DL (ref 32.5–36.3)
MCV RBC AUTO: 93.5 FL (ref 73–96.2)
MONOCYTES # BLD AUTO: 0.7 K/UL (ref 2–10)
MONOCYTES NFR BLD AUTO: 7.6 % (ref 0–11)
NEUTROPHILS # BLD AUTO: 5.1 K/UL (ref 1.8–8.9)
NEUTROPHILS NFR BLD AUTO: 54.2 % (ref 38.5–71.5)
PLATELET # BLD AUTO: 402 K/UL (ref 152–348)
POTASSIUM SERPL-SCNC: 3.7 MMOL/L (ref 3.5–5.1)
RBC # BLD AUTO: 4.14 MIL/UL (ref 4.06–5.63)
WBC # BLD AUTO: 9.3 K/UL (ref 3.6–10.2)
WS STN SPEC: 6.8 G/DL (ref 6.4–8.2)

## 2019-07-18 PROCEDURE — G0378 HOSPITAL OBSERVATION PER HR: HCPCS

## 2019-07-18 PROCEDURE — A4663 DIALYSIS BLOOD PRESSURE CUFF: HCPCS

## 2019-07-19 VITALS — DIASTOLIC BLOOD PRESSURE: 80 MMHG | SYSTOLIC BLOOD PRESSURE: 114 MMHG

## 2019-07-19 VITALS — SYSTOLIC BLOOD PRESSURE: 105 MMHG | DIASTOLIC BLOOD PRESSURE: 71 MMHG

## 2019-07-19 VITALS — DIASTOLIC BLOOD PRESSURE: 80 MMHG | SYSTOLIC BLOOD PRESSURE: 131 MMHG

## 2019-07-19 VITALS — SYSTOLIC BLOOD PRESSURE: 117 MMHG | DIASTOLIC BLOOD PRESSURE: 66 MMHG

## 2019-07-19 LAB
APPEARANCE UR: (no result)
BILIRUB UR QL STRIP: NEGATIVE
COLOR UR: YELLOW
DEPRECATED SQUAMOUS URNS QL MICRO: (no result) /HPF
GLUCOSE UR STRIP-MCNC: NEGATIVE MG/DL
HGB UR QL STRIP: (no result)
KETONES UR STRIP-MCNC: NEGATIVE MG/DL
LEUKOCYTE ESTERASE UR QL STRIP: (no result)
NITRITE UR QL STRIP: NEGATIVE
PH UR STRIP: 6.5 [PH] (ref 5–8)
RBC #/AREA URNS HPF: (no result) /HPF (ref 0–3)
SP GR UR STRIP: 1.02 (ref 1–1.03)
UROBILINOGEN UR STRIP-MCNC: 0.2 E.U./DL
WBC #/AREA URNS HPF: (no result) /HPF

## 2019-07-19 RX ADMIN — MORPHINE SULFATE PRN MG: 4 INJECTION, SOLUTION INTRAMUSCULAR; INTRAVENOUS at 08:43

## 2019-07-19 RX ADMIN — HYDROMORPHONE HYDROCHLORIDE PRN MG: 1 INJECTION, SOLUTION INTRAMUSCULAR; INTRAVENOUS; SUBCUTANEOUS at 15:44

## 2019-07-19 RX ADMIN — MORPHINE SULFATE PRN MG: 4 INJECTION, SOLUTION INTRAMUSCULAR; INTRAVENOUS at 05:15

## 2019-07-19 RX ADMIN — HYDROMORPHONE HYDROCHLORIDE PRN MG: 1 INJECTION, SOLUTION INTRAMUSCULAR; INTRAVENOUS; SUBCUTANEOUS at 11:27

## 2019-07-19 RX ADMIN — MORPHINE SULFATE PRN MG: 4 INJECTION, SOLUTION INTRAMUSCULAR; INTRAVENOUS at 01:55

## 2019-09-02 ENCOUNTER — HOSPITAL ENCOUNTER (EMERGENCY)
Dept: HOSPITAL 12 - ER | Age: 49
Discharge: HOME | End: 2019-09-02
Payer: COMMERCIAL

## 2019-09-02 VITALS — HEIGHT: 67 IN | WEIGHT: 168 LBS | BODY MASS INDEX: 26.37 KG/M2

## 2019-09-02 VITALS — DIASTOLIC BLOOD PRESSURE: 80 MMHG | SYSTOLIC BLOOD PRESSURE: 121 MMHG
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
## 2019-09-02 DIAGNOSIS — S90.32XA: Primary | ICD-10-CM

## 2019-09-02 DIAGNOSIS — F17.200: ICD-10-CM

## 2019-09-02 DIAGNOSIS — Z88.1: ICD-10-CM
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
## 2019-09-02 DIAGNOSIS — Z79.899: ICD-10-CM
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
## 2019-09-02 PROCEDURE — 73630 X-RAY EXAM OF FOOT: CPT

## 2019-09-02 PROCEDURE — A4663 DIALYSIS BLOOD PRESSURE CUFF: HCPCS

## 2019-09-02 PROCEDURE — 96372 THER/PROPH/DIAG INJ SC/IM: CPT

## 2019-09-02 PROCEDURE — 99283 EMERGENCY DEPT VISIT LOW MDM: CPT

## 2019-09-02 NOTE — NUR
Patient discharged to home in stable conditon.  Written and verbal after care 
instructions given. 

Patient verbalizes understanding of instructions. Pt walked out of ER via 
crutches, states wife will drive him home. No acute distress noted.

## 2020-01-12 NOTE — NUR
Patient discharged to home in stable conditon.  Written and verbal after care 
instructions given. 

Patient verbalizes understanding of instructions.

Pt left Er accompained by wife.

## 2020-01-17 NOTE — NUR
Pt's insurance states they are unable to find bed for pt. Was given 
authorization for pt to be admitted here.

## 2020-01-17 NOTE — NUR
Admitted 49 year old male to [facility] with dx of chest pain r/o ACS.  Patient is AAOx4.  
Verbalizing chest pain is relieved but has back and arm/hand pain of 10/10; will medicate 
appropriately.  SR on the monitor.  IV on L AC intact and patent.  Ambulatory.  Admission 
and initial assessment completed.  Plan of care initiated.  Safety measures implemented. 
Will continue to monitor.

## 2020-01-17 NOTE — NUR
PATIENT ALERT ORIENTED, NO SOB NO CHEST PAIN, TELE MONITOR SINUS RHYTHM AT THIS TIME. 
PATIENT STILL COMPLAIN OF BACK PAIN, CONT ON PAIN MANAGEMENT. CALL LIGHT WITHIN REACH, CONT 
TO MONITOR.

## 2020-01-17 NOTE — NUR
PATIENT ALERT ORIENTED NO SOB NO CHEST PAIN, TELE MONITOR SINUS RHYTHM AT THIS TIME.  
PATIENT ON CONTINUES PAIM MAYELA

-------------------------------------------------------------------------------

Addendum: 01/17/20 at 2026 by JOSEPH CASTRO RN

-------------------------------------------------------------------------------

PATIENT ALERT ORIENTED NO SOB NO CHEST PAIN, TELE MONITOR SINUS RHYTHM AT THIS TIME. PATIENT 
ON CONTINUES PAIN MAYELA CHARTING IN ERROR.

## 2020-01-18 ENCOUNTER — HOSPITAL ENCOUNTER (OUTPATIENT)
Dept: HOSPITAL 54 - CT | Age: 50
Discharge: HOME | End: 2020-01-18
Attending: INTERNAL MEDICINE
Payer: COMMERCIAL

## 2020-01-18 VITALS — WEIGHT: 180 LBS | HEIGHT: 68 IN | BODY MASS INDEX: 27.28 KG/M2

## 2020-01-18 DIAGNOSIS — J98.4: Primary | ICD-10-CM

## 2020-01-18 DIAGNOSIS — I77.1: ICD-10-CM

## 2020-01-18 PROCEDURE — 75574 CT ANGIO HRT W/3D IMAGE: CPT

## 2020-01-18 NOTE — NUR
UP AND OUT IN THE HALLWAY AMBULATING STILL COMPLAINING OF PAIN AND NUMGNESS ON BOTH LEFT AND 
RIGHT ARM FINGERS BUT IS ABLE TO MOVE THEM MEDICATED FOR PAIN AS ORDERED PER HIS REQUEST 
TELE DISCONTINUED MADE COMFORTABLE WILL CONTINUE TO OBSERVE.

## 2020-01-18 NOTE — NUR
PATIENT RETURNED FROM [facility] CT CARDIO IN SATISFACTORY CONDITION AWAITING FOR 
REPORTS FROM [place].

## 2020-01-19 NOTE — NUR
PATIENT SLEPT WELL LAST NIGHT , PAIN MANAGED BY DILAUDED, COMPLAINS OF HAND AND ARM PAIN. 
DENIES CHEST PAIN. DENIES ANY ACUTE CHANGES. PATIENT IS IN NO DISTRESS AT THIS TIME. 
ENDORSED TO AM SHIFT NURSE.

## 2020-01-19 NOTE — NUR
RECEIVED DISCHARGE ORDER TO  HOME VIA PRIVATE  CAR WITH  WIFE, DISCHARGE INSTRUCTION AND 
PRESCRIPTION GIVEN  WITH MEDICATION EXPLANATION BY ALEJANDRA THE PHARMACIST, VERBALIZED 
UNDERSTANDING.  IV AND ID BAND REMOVED.  QUESTIONS  AND CONCERNS ADDRESSED BELONGINGS 
ACCOUNTED FOR AND SIGNED. ALL NEEDS ATTENDED.

## 2020-01-19 NOTE — NUR
patient request Norco, took the  Norco out too soon , but administer Norco to  patient  on 
the time it's due.

## 2021-04-06 NOTE — NUR
Patient discharged home stable A/Ox4. Discharge instructions explained to the 
patient, verbalized understanding.

## 2021-06-03 NOTE — NUR
Patient to radiology department for CT of abdomen/chest/pelvis with contrast, 
consent for contrast signed by patient

## 2021-11-21 NOTE — NUR
Patient discharged to home in stable condition.  Written and verbal after care 
instructions given. 

Patient verbalizes understanding of instructions. Stressed follow up or return 
to ER for worsening s/s.

Patient out of ER with steady gait, no acute signs of distress, VSS, all 
belongings taken, instructed not to drive, will uber home.